# Patient Record
Sex: MALE
[De-identification: names, ages, dates, MRNs, and addresses within clinical notes are randomized per-mention and may not be internally consistent; named-entity substitution may affect disease eponyms.]

---

## 2024-09-24 ENCOUNTER — RX ONLY (OUTPATIENT)
Age: 59
Setting detail: RX ONLY
End: 2024-09-24

## 2024-09-24 ENCOUNTER — APPOINTMENT (RX ONLY)
Dept: URBAN - METROPOLITAN AREA CLINIC 45 | Facility: CLINIC | Age: 59
Setting detail: DERMATOLOGY
End: 2024-09-24

## 2024-09-24 PROBLEM — D23.9 OTHER BENIGN NEOPLASM OF SKIN, UNSPECIFIED: Status: ACTIVE | Noted: 2024-09-24

## 2024-09-24 PROCEDURE — ? ADDITIONAL NOTES

## 2024-09-24 PROCEDURE — ? FULL BODY SKIN EXAM - DECLINED

## 2024-09-24 PROCEDURE — ? PREVENTATIVE SKIN CANCER SCREENING

## 2024-09-24 RX ORDER — BENZOYL PEROXIDE 50 MG/G
CREAM TOPICAL
Qty: 30 | Refills: 4 | Status: ERX | COMMUNITY
Start: 2024-09-24

## 2024-09-24 RX ORDER — DOXYCYCLINE 40 MG/1
CAPSULE ORAL
Qty: 30 | Refills: 6 | Status: ERX | COMMUNITY
Start: 2024-09-24

## 2024-09-24 RX ORDER — TRIAMCINOLONE ACETONIDE 1 MG/G
OINTMENT TOPICAL
Qty: 80 | Refills: 1 | Status: CANCELLED | COMMUNITY
Start: 2024-09-24

## 2024-09-24 NOTE — PROCEDURE: PREVENTATIVE SKIN CANCER SCREENING
Billing Warning: If you want to bill the  [id number] cpt codes you must manually override the bill.
Detail Level: Simple
Initial Vs Subsequent Screenings?: Initial
Initial Screening Text: Skin cancer screening was performed on exposed skin.  Lesions were identified that should be followed up for further evaluation.  \\n\\nWe recommend:  1)  Schedule an appointment to follow-up in the office for further exam/testing  2) Full Body Skin Exam is encouraged annually, 3) Daily use of broad spectrum SPF 30+ Sunscreen.

## 2024-09-24 NOTE — PROCEDURE: ADDITIONAL NOTES
Detail Level: Simple
Render Risk Assessment In Note?: yes
Additional Notes: Acne Rosacea :  centrofacial erythema and telangiectasia and erythematous papules and pustules distributed on the right medial forehead\\n\\nPlan : Counseling. I counseled the patient regarding the following:\\nSkin care: Patient instructed to wear broad spectrum sunscreen. Moisturizers with green tints can hide redness.\\nExpectations: Rosacea is chronic. Flushing and pimples can be triggered by: alcohol, stress, exercise, hot temperatures or spicy foods, wind and sun exposure.  Telangiectasias can be improved with laser.\\nContact office if: Rosacea worsens or fails to improve despite months of treatment; patient develops nodules or cysts.\\n\\nPlan : Medication Counseling. Doxycycline Counseling:  Patient counseled regarding possible photosensitivity and increased risk for sunburn.  Patient instructed to avoid sunlight, if possible.  When exposed to sunlight, patients should wear protective clothing, sunglasses, and sunscreen.  The patient was instructed to call the office immediately if the following severe adverse effects occur:  hearing changes, easy bruising/bleeding, severe headache, or vision changes.  The patient verbalized understanding of the proper use and possible adverse effects of doxycycline.  All of the patient's questions and concerns were addressed.\\n\\nBenzoyl Peroxide Counseling: Patient counseled that medicine may cause skin irritation and bleach clothing.  In the event of skin irritation, the patient was advised to reduce the amount of the drug applied or use it less frequently.   The patient verbalized understanding of the proper use and possible adverse effects of benzoyl peroxide.  All of the patient's questions and concerns were addressed.\\n\\nPlan : Prescription. Epsolay 5 % topical cream Qhs\\nQuantity: 30.0 g  Days Supply: 30\\nSig: Apply to affected area of face qhs\\n\\nOracea 40 mg capsule,immediate - delay release \\nQuantity: 30.0 Capsule  Days Supply: 30\\nSig: Take 1 cap by mouth once daily\\n\\nPlan : Prescription Medication Management. Begin the following treatments: Epsolay 5 % topical cream Qhs\\nQuantity: 30.0 g  Days Supply: 30\\nSig: Apply to affected area of face qhs\\n\\nOracea 40 mg capsule,immediate - delay release \\nQuantity: 30.0 Capsule  Days Supply: 30\\nSig: Take 1 cap by mouth once daily.
Additional Notes: Allergic Contact Dermatitis :  linear patches and plaques distributed on the left anterior proximal thigh\\n\\nPlan : Counseling. I counseled the patient regarding the following:\\nSkin care: Patient should use hypoallergenic products such as unscented soaps. Eliminate exposure to all new cosmetics, fragrances, hair products, nail products shampoos, scented soaps, plants, metals and sunscreens.\\nExpectations: Allergic contact dermatitis can persist for several weeks before it fully resolves.  Sometimes, patch testing is necessary if reactions persist or if the patient is in contact with several potential allergens.\\nContact office if: Allergic contact dermatitis worsens or fails to improve despite several weeks of treatment.\\n\\nI recommended the following: \\nTopical Steroids\\n\\nThe following medication counseling was provided:\\nI discussed with the patient that prolonged use of topical steroids can result in the increased appearance of superficial blood vessels (telangiectasias), lightening (hypopigmentation) and thinning of the skin (atrophy).  Patient understands to avoid using high potency steroids in skin folds, the groin or the face.  The patient verbalized understanding of the proper use and possible adverse effects of topical steroids.  All of the patient's questions and concerns were addressed.\\n\\nPlan : Prescription. triamcinolone acetonide 0.1 % topical ointment \\nQuantity: 80.0 g  Days Supply: 30\\nSig: Apply to aa of rash bid x 2 weeks then stop.\\n\\n\\n\\nPlan : Prescription Medication Management. Begin the following treatments: triamcinolone acetonide 0.1 % topical ointment \\nQuantity: 80.0 g  Days Supply: 30\\nSig: Apply to aa of rash bid x 2 weeks then stop.

## 2024-09-24 NOTE — HPI: RASH
What Type Of Note Output Would You Prefer (Optional)?: Bullet Format
How Severe Is Your Rash?: mild
Is This A New Presentation, Or A Follow-Up?: Rash
Additional History: Patient complains of a rash on his forehead that goes and comes.

## 2024-09-25 ENCOUNTER — RX ONLY (OUTPATIENT)
Age: 59
Setting detail: RX ONLY
End: 2024-09-25

## 2024-09-25 RX ORDER — TRIAMCINOLONE ACETONIDE 1 MG/G
CREAM TOPICAL
Qty: 454 | Refills: 0 | Status: ERX | COMMUNITY
Start: 2024-09-25

## 2025-05-01 ENCOUNTER — RX ONLY (RX ONLY)
Age: 60
End: 2025-05-01

## 2025-05-01 RX ORDER — DOXYCYCLINE 40 MG/1
CAPSULE ORAL
Qty: 30 | Refills: 5 | Status: ERX